# Patient Record
Sex: MALE | Race: WHITE
[De-identification: names, ages, dates, MRNs, and addresses within clinical notes are randomized per-mention and may not be internally consistent; named-entity substitution may affect disease eponyms.]

---

## 2017-10-07 ENCOUNTER — HOSPITAL ENCOUNTER (EMERGENCY)
Dept: HOSPITAL 25 - UCEAST | Age: 37
Discharge: HOME | End: 2017-10-07
Payer: COMMERCIAL

## 2017-10-07 VITALS — DIASTOLIC BLOOD PRESSURE: 89 MMHG | SYSTOLIC BLOOD PRESSURE: 129 MMHG

## 2017-10-07 DIAGNOSIS — S50.819A: ICD-10-CM
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
## 2017-10-07 DIAGNOSIS — S51.859A: Primary | ICD-10-CM

## 2017-10-07 DIAGNOSIS — S61.459A: ICD-10-CM

## 2017-10-07 DIAGNOSIS — S60.519A: ICD-10-CM

## 2017-10-07 PROCEDURE — 90715 TDAP VACCINE 7 YRS/> IM: CPT

## 2017-10-07 PROCEDURE — G0463 HOSPITAL OUTPT CLINIC VISIT: HCPCS

## 2017-10-07 PROCEDURE — 90471 IMMUNIZATION ADMIN: CPT

## 2017-10-07 PROCEDURE — 99212 OFFICE O/P EST SF 10 MIN: CPT

## 2017-10-07 NOTE — UC
Bite Injury/Animal HPI





- HPI Summary


HPI Summary: 





Patient presents following a cat bite and scratches that occurred while he was 

vaccinating the cat at the [facility]. He reports several scratches and 

puncture wounds on his hands and forearms. He states the cat had no prior 

immunizations. His tetanus need to be updated today. Bleeding is controlled. 





- History of Current Complaint


Stated Complaint: CAT BITE


Time Seen by Provider: 10/07/17 13:08


Hx Obtained From: Patient


Severity Currently: Moderate


Severity Initially: Moderate


Onset/Duration: Sudden Onset, Lasting Hours


Type of Bite: Animal


Has Animal Been Immunized?: Yes


Character: Puncture, Abrasion/Laceration


Associated Signs And Symptoms: Positive: Negative


Animal Available for Observation: Yes


Animal Control Notified: Yes





- Allergies/Home Medications


Allergies/Adverse Reactions: 


 Allergies











Allergy/AdvReac Type Severity Reaction Status Date / Time


 


Penicillins [PCN] Allergy  See Comment Verified 10/07/17 13:28














PMH/Surg Hx/FS Hx/Imm Hx


Previously Healthy: Yes





- Surgical History


Surgical History: None





- Family History


Known Family History: Positive: None





- Social History


Occupation: Employed Full-time


Alcohol Use: None


Substance Use Type: None


Smoking Status (MU): Never Smoked Tobacco





- Immunization History


Most Recent Influenza Vaccination: 2014


Most Recent Tetanus Shot: unk


Most Recent Pneumonia Vaccination: none





Review of Systems


Constitutional: Negative


Skin: Other - dorum of hands and forearm with several puncture wounds and 

scratches.


Eyes: Negative


ENT: Negative


Respiratory: Negative


Cardiovascular: Negative


Gastrointestinal: Negative


Genitourinary: Negative


Motor: Negative


Neurovascular: Negative


Musculoskeletal: Negative


Neurological: Negative


Psychological: Negative


All Other Systems Reviewed And Are Negative: Yes





Physical Exam


Triage Information Reviewed: Yes


Eye Exam: Normal


ENT Exam: Normal


Dental Exam: Normal


Neck exam: Normal


Neck: Positive: 1


Respiratory Exam: Normal


Cardiovascular Exam: Normal


Abdominal Exam: Normal


Musculoskeletal Exam: Normal


Neurological Exam: Normal


Psychological Exam: Normal


Skin Exam: Normal





Bite Injury Course/Dx





- Course


Course Of Treatment: Patient given tetanus, and rabies form filled out. wounds 

were cleaned. health department notified.





- Differential Dx/Diagnosis


Differential Diagnosis/HQI/PQRI: Puncture - cat bite abrasion puncture wound


Provider Diagnoses: cat bite.  abrasions.  puncture wounds





Discharge





- Discharge Plan


Condition: Stable


Disposition: HOME


Prescriptions: 


DOXYcycline CAP(*) [DOXYcycline 100MG CAP(*)] 100 mg PO BID #20 cap


Patient Education Materials:  Animal Bite (ED), Diphtheria/Acellular Pertussis/

Tetanus Vaccine (By injection)


Referrals: 


No Primary Care Phys,NOPCP [Primary Care Provider] - 


Additional Instructions: 


If you develop any worsening symptoms of infection you will need to go to the 

ER.

## 2019-07-22 ENCOUNTER — HOSPITAL ENCOUNTER (EMERGENCY)
Dept: HOSPITAL 25 - ED | Age: 39
Discharge: HOME | End: 2019-07-22
Payer: COMMERCIAL

## 2019-07-22 VITALS — SYSTOLIC BLOOD PRESSURE: 127 MMHG | DIASTOLIC BLOOD PRESSURE: 84 MMHG

## 2019-07-22 DIAGNOSIS — R07.9: Primary | ICD-10-CM

## 2019-07-22 DIAGNOSIS — L23.7: ICD-10-CM

## 2019-07-22 DIAGNOSIS — I20.9: ICD-10-CM

## 2019-07-22 DIAGNOSIS — Z88.0: ICD-10-CM

## 2019-07-22 DIAGNOSIS — Z79.899: ICD-10-CM
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
## 2019-07-22 LAB
ALBUMIN SERPL BCG-MCNC: 4.2 G/DL (ref 3.2–5.2)
ALBUMIN/GLOB SERPL: 1.9 {RATIO} (ref 1–3)
ALP SERPL-CCNC: 50 U/L (ref 34–104)
ALT SERPL W P-5'-P-CCNC: 25 U/L (ref 7–52)
ANION GAP SERPL CALC-SCNC: 7 MMOL/L (ref 2–11)
AST SERPL-CCNC: 16 U/L (ref 13–39)
BASOPHILS # BLD AUTO: 0.2 10^3/UL (ref 0–0.2)
BUN SERPL-MCNC: 21 MG/DL (ref 6–24)
BUN/CREAT SERPL: 25.6 (ref 8–20)
CALCIUM SERPL-MCNC: 9.3 MG/DL (ref 8.6–10.3)
CHLORIDE SERPL-SCNC: 104 MMOL/L (ref 101–111)
EOSINOPHIL # BLD AUTO: 0.7 10^3/UL (ref 0–0.6)
GLOBULIN SER CALC-MCNC: 2.2 G/DL (ref 2–4)
GLUCOSE SERPL-MCNC: 107 MG/DL (ref 70–100)
HCO3 SERPL-SCNC: 27 MMOL/L (ref 22–32)
HCT VFR BLD AUTO: 45 % (ref 42–52)
HGB BLD-MCNC: 15.6 G/DL (ref 14–18)
INR PPP/BLD: 0.93 (ref 0.82–1.09)
LYMPHOCYTES # BLD AUTO: 5.4 10^3/UL (ref 1–4.8)
MCH RBC QN AUTO: 31 PG (ref 27–31)
MCHC RBC AUTO-ENTMCNC: 35 G/DL (ref 31–36)
MCV RBC AUTO: 90 FL (ref 80–94)
MONOCYTES # BLD AUTO: 0.8 10^3/UL (ref 0–0.8)
NEUTROPHILS # BLD AUTO: 6.5 10^3/UL (ref 1.5–7.7)
NRBC # BLD AUTO: 0 10^3/UL
NRBC BLD QL AUTO: 0.1
PLATELET # BLD AUTO: 257 10^3/UL (ref 150–450)
POTASSIUM SERPL-SCNC: 3.7 MMOL/L (ref 3.5–5)
PROT SERPL-MCNC: 6.4 G/DL (ref 6.4–8.9)
RBC # BLD AUTO: 4.97 10^6 /UL (ref 4.18–5.48)
SODIUM SERPL-SCNC: 138 MMOL/L (ref 135–145)
TROPONIN I SERPL-MCNC: 0 NG/ML (ref ?–0.04)
WBC # BLD AUTO: 13.5 10^3/UL (ref 3.5–10.8)

## 2019-07-22 PROCEDURE — 85060 BLOOD SMEAR INTERPRETATION: CPT

## 2019-07-22 PROCEDURE — 84484 ASSAY OF TROPONIN QUANT: CPT

## 2019-07-22 PROCEDURE — 83880 ASSAY OF NATRIURETIC PEPTIDE: CPT

## 2019-07-22 PROCEDURE — 80053 COMPREHEN METABOLIC PANEL: CPT

## 2019-07-22 PROCEDURE — 83605 ASSAY OF LACTIC ACID: CPT

## 2019-07-22 PROCEDURE — 85025 COMPLETE CBC W/AUTO DIFF WBC: CPT

## 2019-07-22 PROCEDURE — 85610 PROTHROMBIN TIME: CPT

## 2019-07-22 PROCEDURE — 71045 X-RAY EXAM CHEST 1 VIEW: CPT

## 2019-07-22 PROCEDURE — 93005 ELECTROCARDIOGRAM TRACING: CPT

## 2019-07-22 PROCEDURE — 99284 EMERGENCY DEPT VISIT MOD MDM: CPT

## 2019-07-22 PROCEDURE — 36415 COLL VENOUS BLD VENIPUNCTURE: CPT

## 2019-07-22 NOTE — ED
HPI Chest Pain





- HPI Summary


HPI Summary: 





This pt is a 39 y/o male presenting to [facility] c/o chest pain since 01:00 today. 

Pt describes his chest pain as substernal that is not radiating. He notes his 

chest pain currently has calmed down. Initially his chest pain was described as 

sharp and stabbing but currently notes "there's just an ache there." Denies SOB

, nausea, diaphoresis, or any other symptoms with chest pain. He rates his 

chest pain now 5/10 in severity. 


Pt reports hx of myopericarditis approximately 5 years ago. He denies any 

recent runny nose, URI, cough. 


He does have poison ivy rash for which he was prescribed 60 mg of Prednisone 

and a topical steroid. Pt has taken Prednisone for the past 6 days.





- History of Current Complaint


Chief Complaint: EDChestPainROMI


Hx Obtained From: Patient


Onset/Duration: Started Hours Ago, Still Present


Timing: Lasting Hours


Current Severity: Moderate


Pain Intensity: 5


Pain Scale Used: 0-10 Numeric


Chest Pain Location: Mid Sternal


Chest Pain Radiates: No


Character: Dull/Aching - Ache now


Aggravating Factor(s): Nothing


Alleviating Factor(s): Nothing


Associated Signs and Symptoms: Positive: Chest Pain.  Negative: Shortness of 

Breath, Diaphoresis, Nausea, Cough, URI





- Allergy/Home Medications


Allergies/Adverse Reactions: 


 Allergies











Allergy/AdvReac Type Severity Reaction Status Date / Time


 


Penicillins Allergy  See Comment Verified 07/22/19 08:54











Home Medications: 


 Home Medications





predniSONE TAB* [Deltasone 20 MG TAB*] 60 mg PO DAILY 07/22/19 [History 

Confirmed 07/22/19]











PMH/Surg Hx/FS Hx/Imm Hx


Endocrine/Hematology History: 


   Denies: Hx Diabetes, Hx Thyroid Disease


Cardiovascular History: Reports: Hx Angina


   Denies: Hx Hypertension - myopericarditis 6 months ago, Hx Pacemaker/ICD


Respiratory History: 


   Denies: Hx Asthma, Hx Chronic Obstructive Pulmonary Disease (COPD)


GI History: 


   Denies: Hx Ulcer


 History: 


   Denies: Hx Renal Disease


Sensory History: 


   Denies: Hx Hearing Aid


Psychiatric History: 


   Denies: Hx Panic Disorder





- Surgical History


Surgery Procedure, Year, and Place: appy


Infectious Disease History: No


Infectious Disease History: 


   Denies: Hx Clostridium Difficile, Hx Hepatitis, Hx Human Immunodeficiency 

Virus (HIV), Hx of Known/Suspected MRSA, Hx Shingles, Hx Tuberculosis, History 

Other Infectious Disease, Traveled Outside the [place] in Last 30 Days





- Family History


Known Family History: 


   Negative: Cardiac Disease, Diabetes





- Social History


Alcohol Use: None


Substance Use Type: Reports: None


Smoking Status (MU): Never Smoked Tobacco





Review of Systems


Negative: Fever, Skin Diaphoresis


Negative: Nasal Discharge


Positive: Chest Pain


Negative: Shortness Of Breath, Cough


Negative: Nausea


All Other Systems Reviewed And Are Negative: Yes





Physical Exam





- Summary


Physical Exam Summary: 





Constitutional: Well-developed, Well-nourished, Alert. (-) Distressed


Skin: Warm, Dry


HENT: Normocephalic; Atraumatic


Eyes: Conjunctiva normal


Neck: Musculoskeletal ROM normal neck. (-) JVD, (-) Stridor, (-) Tracheal 

deviation


Cardio: Rhythm regular, rate normal, Heart sounds normal; Intact distal pulses; 

The pedal pulses are 2+ and symmetric. Radial pulses are 2+ and symmetric. (-) 

Murmur


Pulmonary/Chest wall: Effort normal. (-) Respiratory distress, (-) Wheezes, (-) 

Rales


Abd: Soft, (-) tenderness, (-) Distension, (-) Guarding, (-) Rebound


Musculoskeletal: (-) Edema


Lymph: (-) Cervical adenopathy


Neuro: Alert, Oriented x3


Psych: Mood and affect Normal


Triage Information Reviewed: Yes


Vital Signs On Initial Exam: 


 Initial Vitals











Temp Pulse Resp BP Pulse Ox


 


 97.4 F   76   18   150/91   100 


 


 07/22/19 08:19  07/22/19 08:19  07/22/19 08:19  07/22/19 08:19  07/22/19 08:19











Vital Signs Reviewed: Yes





Diagnostics





- Vital Signs


 Vital Signs











  Temp Pulse Resp BP Pulse Ox


 


 07/22/19 08:19  97.4 F  76  18  150/91  100














- Laboratory


Result Diagrams: 


 07/22/19 08:37





 07/22/19 08:37


Lab Statement: Any lab studies that have been ordered have been reviewed, and 

results considered in the medical decision making process.





- Radiology


  ** Chest XR


Radiology Interpretation Completed By: Radiologist


Summary of Radiographic Findings: IMPRESSION: No evidence for active 

cardiopulmonary disease. Dr. Davis has reviewed this report.





- EKG


  ** 08:


Cardiac Rate: NL - at 67 bpm


EKG Rhythm: Sinus Rhythm


Summary of EKG Findings: Normal NM, Borderline QRS, normal QTc. STs are 

elevated in leads V2, V3, V4, and V6. Normal early repolarization. Normal EKG.





  ** 08:18


Cardiac Rate: NL - at 67 bpm


EKG Rhythm: Sinus Rhythm


Summary of EKG Findings: Normal NM, Borderline QRS, normal QTc. STs are 

elevated in leads V2, V3, V4, and V6. Normal early repolarization. Normal EKG.





  ** 12:33


Cardiac Rate: NL - at 65 bpm


EKG Rhythm: Sinus Rhythm


Summary of EKG Findings: Normal NM, Normal QRS, Normal QTc.  ST elevations in V2

, V3, V4, V5, V6.  Normal early repolarization. Normal EKG.





Re-Evaluation





- Re-Evaluation


  ** First Eval


Re-Evaluation Time: 09:14


Change: Improved


Comment: His pain is getting better but nitroglycerin with no effect.





  ** Second Eval


Re-Evaluation Time: 10:28


Change: Improved


Comment: His pain is down to 1-2 but pain is still there. GI cocktail with no 

effect. Will wait for second troponin and second EKG





  ** Third Eval


Re-Evaluation Time: 12:41


Change: Improved


Comment: Reviewed second EKG and negative troponins with patient. Will speak 

with cardiology.





  ** Fourth Eval


Re-Evaluation Time: 13:05


Comment: Reviewed discharge plan with patient.





Chest Pain Course/Dx





- Course


Assessment/Plan: Pt is a 39 y/o male, with hx of myopericarditis, presenting to 

[facility] c/o chest pain since 01:00 today. Pt describes his chest pain as 

substernal that is not radiating. He notes his chest pain currently has calmed 

down.  Physical exam is unremarkable.  Lab results are unremarkable except for 

WBC of 13.5. Patient has two negative troponins.  First EKG is a normal sinus 

rhyhtm at 67 bpm, normal EKG. Second EKG shows normal sinus rhythm at 65 bpm, 

normal EKG.  In the ED course the pt was given nitroglycerin and GI cocktail.  

Discussed the case with Dr. Alejandra, cardiologist, who recommends steroids and 

discharge the pt home.  Pt was given a prescription for Prednisone.





- Diagnoses


Provider Diagnoses: 


 Chest pain, Poison ivy








- Provider Notifications


Discussed Care Of Patient With: Black Alejandra


Time Discussed With Above Provider: 13:03


Instructed by Provider To: Other - Discussed with Dr. Alejandra, cardiologist, who 

recommends steroids and discharge.





Discharge





- Sign-Out/Discharge


Documenting (check all that apply): Patient Departure - Discharge home


Patient Received Moderate/Deep Sedation with Procedure: No





- Discharge Plan


Condition: Stable


Disposition: HOME


Prescriptions: 


predniSONE [Prednisone 20 MG TAB] 40 mg PO DAILY 7 Days #14 tablet


Patient Education Materials:  Chest Pain (ED), Poison Ivy (ED)


Print Language: ENGLISH


Referrals: 


Aleksandr Pearl MD [Primary Care Provider] - 





- Billing Disposition and Condition


Condition: STABLE


Disposition: Home





- Attestation Statements


Document Initiated by Scribe: Yes


Documenting Scribe: Renata Moise


Provider For Whom Santiagoibmadeleine is Documenting (Include Credential): Honey Marquis MD


Scribe Attestation: 


Renata MAGANA scrgordyed for Honey Wu MD on 07/22/19 at 1917. 


Scribe Documentation Reviewed: Yes


Provider Attestation: 


The documentation as recorded by the Renata victor accurately reflects 

the service I personally performed and the decisions made by me, Honey Wu MD


Status of Scribe Document: Viewed

## 2019-10-21 ENCOUNTER — HOSPITAL ENCOUNTER (EMERGENCY)
Dept: HOSPITAL 25 - ED | Age: 39
Discharge: HOME | End: 2019-10-21
Payer: COMMERCIAL

## 2019-10-21 VITALS — DIASTOLIC BLOOD PRESSURE: 64 MMHG | SYSTOLIC BLOOD PRESSURE: 131 MMHG

## 2019-10-21 DIAGNOSIS — T65.891A: Primary | ICD-10-CM

## 2019-10-21 DIAGNOSIS — H10.211: ICD-10-CM
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
## 2019-10-21 DIAGNOSIS — Z88.0: ICD-10-CM

## 2019-10-21 PROCEDURE — 99282 EMERGENCY DEPT VISIT SF MDM: CPT

## 2019-10-21 NOTE — XMS REPORT
Continuity of Care Document (CCD)

 Created on:2019



Patient:Lasha Quiroz

Sex:Male

:1980

External Reference #:[id number]





Demographics







 Address  [address] [place] [id number]

 

 Home Phone  [phone]

 

 Mobile Phone  [phone]

 

 Work Phone  [phone]

 

 Email Address  [email or web address]

 

 Preferred Language  en

 

 Marital Status  Not  or 

 

 Yazdanism Affiliation  Unknown

 

 Race  White

 

 Ethnic Group  Not  or 









Author







 Name  Aleksandr Beebe MD

 

 Address  [address] [place] [address]









Care Team Providers







 Name  Role  Phone

 

 Aleksandr Pearl MD - Family  Care Team Information   [phone]
[phone]



 Medicine    

 

 [facility] Utilization   Care Team Information   [phone]
[phone]



 - Health Educator    

 

 Lakhwinder Kevin MD - Cardiovascular  Care Team Information   [phone]
[phone]



 Disease    









Problems







 Active Problems  Provider  Date

 

 Arthralgia of the lower leg  Clayton Alcantar M.D.  Onset: 2013

 

 Enthesopathy  Clayton Alcantar M.D.  Onset: 2012

 

 Contact dermatitis due to plants, except  Aleksandr Pearl M.D.  Onset: 



 food    







Social History







 Type  Date  Description  Comments

 

 Birth Sex    Unknown  

 

 Tobacco Use  Start: Unknown  Nonsmoker  

 

 Tobacco Use  Start: Unknown  Patient has never smoked  

 

 Smoking Status  Reviewed: 18  Patient has never smoked  







Allergies, Adverse Reactions, Alerts







 Active Allergies  Reaction  Severity  Comments  Date

 

 Penicillins        2014









 Inactive Allergies









 NKDA        2012







Medications







 Active Medications  SIG  Qnty  Indications  Ordering Provider  Date

 

 Clobetasol Propionate  use twice a day  60gm    Aleksandr MARSH  2019



                   0.05%  on arms and      MD Abiel  



 Cream  trunk        

 

 Multivitamins  1 po qd      Unknown  



            Tablets          



           

 

 Ibuprofen  3 tabs by mouth      Unknown  



       200mg Tablets  up to 4 times a        



   day as needed        









 History Medications









 Prednisone  3 tabs daily  18tabs  R21  Aleksandr MARSH  2019 -



           10mg  for 3 days,      MD Abiel  2019



 Tablets  then 2 tabs        



   daily for 3        



   days, then 1        



   tab daily for 3        



   days.        

 

 Clobetasol  use on arms and  30gm    Aleksandr T.  2019 -



 Propionate  trunk as needed      MD Abiel  2019



           0.05%  for up to 14        



 Cream  days        



           







Immunizations







 CPT Code  Status  Date  Vaccine  Lot #

 

 [id number]  Given  2018  VFC Influenza vac quadrivalent preservative  



       free 3yrs and up  

 

 04715  Given  2016  Influenza vac quadrivalent preservative free  



       3yrs and up  

 

 24808  Given  2010  DO Not Use Split Influenza Virus Vaccine  [id number]

 

 62849  Given  2004  Td Immunization, For Use In Individuals 7 Years  



       Or Older  







Vital Signs







 Date  Vital  Result  Comment

 

 2019  4:42pm  BP Systolic  118 mmHg  









 BP Diastolic  72 mmHg  

 

 Heart Rate  82 /min  

 

 Body Temperature  97.9 F  

 

 Respiratory Rate  16 /min  

 

 Weight  228.00 lb  









 2019  4:49pm  BP Systolic  120 mmHg  









 BP Diastolic  70 mmHg  

 

 Heart Rate  90 /min  

 

 Body Temperature  98.2 F  

 

 Respiratory Rate  20 /min  

 

 Weight  233.00 lb  







Results







 Test  Date  Facility  Test  Result  H/L  Range  Note

 

 Laboratory test  2019  [facility]  Troponin I  0.00 ng/mL    <0.04  1



 finding              

 

 Laboratory test  2019  [facility]  B-Type Natriuretic  25 pg/mL    <=100  



 finding      Peptide BNP        









 Lactic Acid  1.4 mmol/L  Normal  0.5-2.0  2









 Inr/Protime  2019  [facility]  Inr  0.93  Normal  0.82-1.09  3

 

 CBC Auto Diff  2019  [facility]  White Blood Count  13.5 10^3/uL  High  3.5-
10.8  









 Red Blood Count  4.97 10^6/uL  Normal  4.18-5.48  

 

 Hemoglobin  15.6 g/dL  Normal  14.0-18.0  

 

 Hematocrit  45 %  Normal  42-52  

 

 Mean Corpuscular Volume  90 fL  Normal  80-94  

 

 Mean Corpuscular Hemoglobin  31 pg  Normal  27-31  

 

 Mean Corpuscular HGB Conc  35 g/dL  Normal  31-36  

 

 Red Cell Distribution Width  14 %  Normal  10-15  

 

 Platelet Count  257 10^3/uL  Normal  150-450  

 

 Mean Platelet Volume  8.4 fL  Normal  7.4-10.4  

 

 Abs Neutrophils  6.5 10^3/uL  Normal  1.5-7.7  

 

 Abs Lymphocytes  5.4 10^3/uL  High  1.0-4.8  

 

 Abs Monocytes  0.8 10^3/uL  Normal  0-0.8  

 

 Abs Eosinophils  0.7 10^3/uL  High  0-0.6  

 

 Abs Basophils  0.2 10^3/uL  Normal  0-0.2  

 

 Abs Nucleated RBC  0.0 10^3/uL      

 

 Granulocyte %  48.5 %      

 

 Lymphocyte %  39.7 %      

 

 Monocyte %  5.6 %      

 

 Eosinophil %  4.9 %      

 

 Basophil %  1.3 %      

 

 Nucleated Red Blood Cells %  0.1      









 Comp Metabolic Panel  2019  [facility]  Sodium  138 mmol/L  Normal  135-145  









 Chloride  104 mmol/L  Normal  101-111  

 

 Co2 Carbon Dioxide  27 mmol/L  Normal  22-32  

 

 Glucose  107 mg/dL  High    

 

 Blood Urea Nitrogen  21 mg/dL  Normal  6-24  

 

 Creatinine  0.82 mg/dL  Normal  0.67-1.17  

 

 BUN/Creatinine Ratio  25.6  High  8-20  

 

 Calcium  9.3 mg/dL  Normal  8.6-10.3  

 

 Total Protein  6.4 g/dL  Normal  6.4-8.9  

 

 Albumin  4.2 g/dL  Normal  3.2-5.2  

 

 Globulin  2.2 g/dL  Normal  2-4  

 

 Albumin/Globulin Ratio  1.9  Normal  1-3  

 

 Total Bilirubin  0.40 mg/dL  Normal  0.2-1.0  

 

 Alkaline Phosphatase  50 U/L  Normal    

 

 Alt  25 U/L  Normal  7-52  

 

 Egfr Non-  105.1    >60  

 

 Egfr   127.2    >60  4

 

 Potassium  3.7 mmol/L  Normal  3.5-5.0  

 

 Anion Gap  7 mmol/L  Normal  2-11  

 

 Ast  16 U/L  Normal  13-39  









 Laboratory test finding  2019  [facility]  Troponin I  0.00 ng/mL    <0.04  5









 Pathologist Review  (SEE NOTE)      6









 1  Troponin-I testing on Plasma Separator Tubes (PST) has a



   known false positive rate of 0.20-0.40%.  All positive



   troponins reflex immediately to secondary confirmatory



   testing.



   



   Using the [employer] 800 Access Immunoassay systems, the



   99th percentile upper reference limit was demonstrated to be



   < 0.03 ng/mL.

 

 2  [facility] Severe Sepsis and Septic Shock Management Bundle Measure



   requires all lactic acids initially measuring >2.0 mmol/L be



   repeated.

 

 3  Standard intensity warfarin therapeutic range: 2.0-3.0



   High intensity warfarin therapeutic range: 2.5-3.5

 

 4  *******Because ethnic data is not always readily available,



   this report includes an eGFR for both -Americans and



   non- Americans.****



   The National Kidney Disease Education Program (NKDEP) does



   not endorse the use of the MDRD equation for patients that



   are not between the ages of 18 and 70, are pregnant, have



   extremes of body size, muscle mass, or nutritional status,



   or are non- or non-.



   According to the National Kidney Foundation, irrespective of



   diagnosis, the stage of the disease is based on the level of



   kidney function:



   Stage Description                      GFR(mL/min/1.73 m(2))



   1     Kidney damage with normal or decreased GFR       90



   2     Kidney damage with mild decrease in GFR          60-89



   3     Moderate decrease in GFR                         30-59



   4     Severe decrease in GFR                           15-29



   5     Kidney failure                       <15 (or dialysis)

 

 5  Troponin-I testing on Plasma Separator Tubes (PST) has a



   known false positive rate of 0.20-0.40%.  All positive



   troponins reflex immediately to secondary confirmatory



   testing.



   



   Using the [employer] 800 Access Immunoassay systems, the



   99th percentile upper reference limit was demonstrated to be



   < 0.03 ng/mL.

 

 6  Reactive lymphocytosis and monocytosis.



   



   Reviewed by Christen Scott MD







Procedures







 Description

 

 No Information Available







Medical Devices







 Description

 

 No Information Available







Encounters







 Type  Date  Location  Provider  Dx  Diagnosis

 

 Office Visit  2019  Main Office  Aleksandr MARSH  R07.89  Other chest pain



   4:30p    MD Abiel    









 L24.7  Irritant contact dermatitis due to plants, except food







Assessments







 Date  Code  Description  Provider

 

 2019  R21  Rash and other nonspecific skin eruption  Aleksandr Beebe MD

 

 2019  R07.89  Other chest pain  Aleksandr Beebe MD

 

 2019  L24.7  Irritant contact dermatitis due to plants,  Aleksandr Beebe MD



     except food  







Plan of Treatment

2019 - Aleksandr Beebe MDR21 Rash and other nonspecific skin 
eruptionAllNew Medication:Clobetasol Propionate 0.05 % - use twice a day on 
arms and trunkComments:Medication Management Patient Understands medications he'
s taking?     Yes    No  Are there Barriersto Adherence?    Yes    No  Has the 
patient been asked about herbal supplements and therapies, and OTC  meds?      
Yes    No



Functional Status







 Description

 

 No Information Available







Mental Status







 Description

 

 No Information Available







Referrals







 Refer to   Reason for Referral  Status  Appt Date

 

 Lakhwinder Kevin MD   Chest pain with a history of myopericarditis 5  Scheduled  
2019



   yrs ago    









 [address]

 

 [place] [address] [phone]

## 2019-10-21 NOTE — XMS REPORT
[facility] - Continuity of Care Document

 Created on:2019



Patient:SAM YEAGER

Sex:Male

:1980

External Reference #:[id number]





Demographics







 Address  [address] [place], [place] [address]

 

 Mobile Phone  [phone]

 

 Preferred Language  en

 

 Marital Status  Not  or 

 

 Evangelical Affiliation  Unknown

 

 Race  White

 

 Ethnic Group  Not  or 









Author







 Organization  [facility]









Support







 Name  Relationship  Address  Phone

 

 DWAINE YEAGER  spouse  [address] [place] [address]  [phone]



     [place] [address]  

 

 DWAINE YEAGER  spouse  [address]  [phone]



     [place] [address]  









Allergies and Intolerances

No Known Allergies



Medications

No Known Medications



Medications At Time Of Discharge

No data in the system



Problems

No Data in the system



Procedures

No data in the system



Results

******** Laboratory Results ******** Order:  CBC DIFF Specimen Source: Body Site
: Legend: (G,H) = High, (GG,HH,CH,#H) = Above High Threshold, (#,L) = Low, (##,
CL,#L,LL) = Below Low Threshold, (C,CC,CA,#A,A) = Abnormal





  LOINC   Test   Result   Flag   Range   Units   Date

 

 90-2  1WBC # Bld Auto  6.3    4.8-10.8  K/uL  10/05/2019 14:29

 

 26309-8  1RBC # Bld  5.10    4.60-6.20  M/uL  10/05/2019 14:29

 

 718-7  1Hgb Bld-mCnc  15.6    13.5-18.0  gm/dL  10/05/2019 14:29

 

 4544-3  1Hct VFr Bld Auto  46.4    41.0-53.0  %  10/05/2019 14:29

 

 787-2  1MCV RBC Auto  91.0    80.0-100.0  fL  10/05/2019 14:29

 

 40397-2  1MCHC RBC-mCnc  33.6    30.0-36.5  %  10/05/2019 14:29

 

 28405-4  1MCH RBC Qn  30.6    27.0-34.0  pg  10/05/2019 14:29

 

 99596-8  1RDW RBC  11.8    11.0-15.0  %  10/05/2019 14:29

 

 777-3  1Platelet # Bld Auto  239    130-450  K/uL  10/05/2019 14:29

 

 73042-3  1PMV Bld Auto  7.7    6.0-12.0  fL  10/05/2019 14:29

 

 751-8  1Neutrophils # Bld Auto  46    37-80  %  10/05/2019 14:29

 

 86651-8  1Lymphocytes NFr Bld  43    10-50  %  10/05/2019 14:29

 

 5905-5  1Monocytes NFr Bld Auto  9    0-12  %  10/05/2019 14:29

 

 18898-9  1Eosinophil # Bld  1    <=8  %  10/05/2019 14:29

 

 704-7  1Basophils # Bld Auto  1    <=3  %  10/05/2019 14:29

 

 13041-8  1Neutrophils # Bld  2.9    1.8-8.6  K/uL  10/05/2019 14:29

 

 731-0  1Lymphocytes # Bld Auto  2.7    0.5-5.0  K/uL  10/05/2019 14:29

 

 742-7  1Monocytes # Bld Auto  0.6    0.0-1.3  K/uL  10/05/2019 14:29

 

 70957-6  1Eosinophil # Bld  0.1    0.0-0.9  K/uL  10/05/2019 14:29

 

 704-7  1Basophils # Bld Auto  0.1    0.0-0.3  K/ul  10/05/2019 14:29



 Performing Lab Footnotes:[facility] - [id number] - 
[address] [place] [address] TRINITY ARNOLD SALUDD1





 _______________________________________________________________________________
_______ Order:  COMPREHENSIVE PANEL Specimen Source: Body Site: Legend: (G,H) = 
High, (GG,HH,CH,#H) = Above High Threshold, (#,L) = Low, (##,CL,#L,LL) = Below 
Low Threshold, (C,CC,CA,#A,A) = Abnormal





  LOINC   Test   Result   Flag   Range   Units   Date

 

 2951-2  1Sodium SerPl-sCnc  141    136-145  mmol/L  10/05/2019 14:29

 

 2823-3  1Potassium SerPl-sCnc  4.0    3.5-5.2  mmol/L  10/05/2019 14:29

 

 5-0  1Chloride SerPl-sCnc  107    100-108  mmol/L  10/05/2019 14:29

 

 8-9  1CO2 SerPl-sCnc  26    21-32  mmol/L  10/05/2019 14:29

 

 2345-7  1Glucose SerPl-mCnc  89      mg/dL  10/05/2019 14:29

 

 3094-0  1BUN SerPl-mCnc  15    7-21  mg/dL  10/05/2019 14:29

 

 2160-0  1Creat SerPl-mCnc  0.9    0.6-1.3  mg/dL  10/05/2019 14:29









 **Interpretive :  1Normal Kidney Function or Mild Disease - GFR >OR= 60 
Chronic



   Kidney Disease - GFR 15-59 Renal Failure - GFR < 15 GFR not



   calculated on patients under 18 years of age.  Calculated



   (estimated) GFR is based on the MDRD Study equation, which



   assumes a steady state for creatinine.  Estimated GFR may not be



   appropriate for medication dosing.









 26324-1  1Ca-I SerPl-mCnc  9.7    8.5-10.8  mg/dL  10/05/2019 14:29

 

 58788-0  1GFR/BSA.pred SerPl-ArVRat  >60        10/05/2019 14:29

 

 52156-4  1Bilirub Bld-mCnc  0.4    0.0-1.2  mg/dL  10/05/2019 14:29

 

 2885-2  1Prot SerPl-mCnc  7.0    6.4-8.2  gm/dL  10/05/2019 14:29

 

 1751-7  1Albumin SerPl-mCnc  4.4    3.4-4.8  gm/dL  10/05/2019 14:29

 

 6768-6  1ALP SerPl-cCnc  61      U/L  10/05/2019 14:29

 

 1742-6  1ALT SerPl-cCnc  23    0-55  U/L  10/05/2019 14:29

 

 1920-8  1AST SerPl-cCnc  18    5-37  U/L  10/05/2019 14:29



 Performing Lab Footnotes:[facility] - [id number] - 
[address] [place] [address] TRINITY MENDEZ





 _______________________________________________________________________________
_______******** Reference Laboratory Results ******** Order:  LYME AB with 
Confirmation (3 Days to Results) Specimen Source: Body Site:





  LOINC Code   Test   Result   Flag   Range   Units   Date

 

 36187-0  1Borrelia burgdorferi  <0.91    0.00-0.90  ISR  10/5/2019



   Ab.IgG+Ig          2:29:00 PM









   Note:

 

   Negative         <0.91

 

   Equivocal  0.91 - 1.09

 

   Positive         >1.09



Performing Lab Footnotes:[facility] [phone] - [address] [place], [place] [id number] LUISA B REYES1





 _______________________________________________________________________________
_______



Social History







 Code  Code System  Social History Observation  Description  Dates Observed

 

 358544240  SNOMED CT  Current Smoking Status  Never smoker  

 

 UNK  AdministrativeGender  Sex Assigned At Birth  Unknown  







Vital Signs







 Code  Code System  Vitals  Value  Date

 

 2710-5  LOINC  Body Temperature  97.6 [degF]  10/05/2019

 

 8865-8  LOINC  Pulse Rate  70 {beats}/min  10/05/2019

 

 9279-1  [facility]  Respiratory Rate  18 /min  10/05/2019

 

 18363-9  INC  O2% BldC Oximetry  99 %  10/05/2019

 

 8480-6  INC  BP Systolic  142 mm[Hg]  10/05/2019

 

 8462-4  LOINC  BP Diastolic  87 mm[Hg]  10/05/2019

 

 8302-2  INC  Height  71 [in_i]  10/05/2019

 

 68220-6  INC  Weight  104.54 kg  10/05/2019

 

 3140-1  LOINC  Body surface area Derived from formula  2.24 m2  10/05/2019

 

 74383-1  INC  BMI (Body Mass Index)  32.2 kg/m2  10/05/2019







Goals Section

No data in the system



Health Concerns

No data in the systemEncounter Diagnosis









 Date  Code  Code System  Diagnosis  Status

 

   Z23  ICD10  ENCOUNTER FOR IMMUNIZATION  Active







Advance Directives

No Advance Directives Reported



Encounters







 Encounter Diagnosis  Location  Date

 

  ENCOUNTER FOR IMMUNIZATION  [facility]  10/05/2019







Family History

Family history not obtained



Functional Status







 Code  Functional Condition  Code System  Date  Status

 

   Independent adls  SNOMED CT  10/05/2019  Active

 

   Appears well nourished/hydrated  SNOMED CT  10/05/2019  Active







Immunizations







 Vaccine Code  Code System  Vaccine Name  Date  Status

 

 175  CVX  Human Rabies vaccine from human diploid  10/02/2019  Completed



     cell culture    

 

 175  CVX  Human Rabies vaccine from human diploid  10/05/2019  Completed



     cell culture    

 

 115  CVX  tetanus toxoid, reduced diphtheria  10/05/2019  Completed



     toxoid, and acellular pertussis    



     vaccine, adsorbed    







Medical Equipment

No data in the system



Mental Status







 Code  Cognitive Condition  Code System  Date  Status

 

   Perrl  SNOMED CT  10/05/2019  Active

 

   Oriented x 3  SNOMED CT  10/05/2019  Active







Assessment and Plan

Assessments

No data in the systemPlan Of Treatment

No data in the systemPending Tests

No data in the system



Hospital Discharge Instructions

No data in the system



Reason for Visit







 Reason for Visit

 

 Rabies Vaccine
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
## 2019-10-21 NOTE — XMS REPORT
Continuity of Care Document (CCD)

 Created on:2019



Patient:Lasha Quiroz

Sex:Male

:1980

External Reference #:[id number]





Demographics







 Address  [address] [place] [address]

 

 Home Phone  [phone]

 

 Preferred Language  en

 

 Marital Status  Not  or 

 

 Mandaen Affiliation  Unknown

 

 Race  White

 

 Ethnic Group  Not  or 









Author







 Name  Lakhwinder Kevin DO FACC (transmitted by agent of provider Patricia Trujillo)

 

 Address  [address]



   Unavailable



   [place] [address]









Care Team Providers







 Name  Role  Phone

 

 Aleksandr Pearl MD - Family Medicine  Care Team Information   [phone]
[phone]









Problems







 Description

 

 No Information Available







Social History







 Type  Date  Description  Comments

 

 Birth Sex    Unknown  

 

 Tobacco Use  Start: Unknown  Never Smoked Cigarettes  

 

 Smoking Status  Reviewed: 19  Never Smoked Cigarettes  

 

 ETOH Use    Rarely consumes alcohol  

 

 Tobacco Use  Start: Unknown  Patient has never smoked  

 

 Recreational Drug Use    Denies Drug Use  

 

 Exercise Type/Frequency    Does not exercise  







Allergies, Adverse Reactions, Alerts







 Active Allergies  Reaction  Severity  Comments  Date

 

 Penicillin      per  patient  2015







Medications







 Active Medications  SIG  Qnty  Indications  Ordering Provider  Date

 

 Multivitamins  1 by mouth every      Unknown  



            Capsules  day        



           

 

 Ibuprofen  3 tabs by mouth      Unknown  



       200mg Tablets  up to four times        



   a day as needed        







Immunizations







 Description

 

 No Information Available







Vital Signs







 Date  Vital  Result  Comment

 

 2019  2:36pm  Height  71 inches  5'11"









 Weight  230.00 lb  with out shoes

 

 BP Systolic  124 mmHg  Rue lg cuff

 

 BP Diastolic  84 mmHg  Rue lg cuff

 

 BP Systolic Sitting  122 mmHg  Lue lg cuff

 

 BP Diastolic Sitting  82 mmHg  Lue lg cuff

 

 BP Systolic Standing  120 mmHg  Lue lg cuff

 

 BP Diastolic Standing  90 mmHg  Lue lg cuff

 

 BMI (Body Mass Index)  32.1 kg/m2  

 

 Ejection Fraction  55-60%  date 4/03/15 ECHO









 2015 12:57pm  Height  71 inches  5'11"









 Weight  244.25 lb  with shoes

 

 Heart Rate  82 /min  

 

 BP Systolic Sitting  124 mmHg  LA reg cuff

 

 BP Diastolic Sitting  88 mmHg  LA reg cuff

 

 Respiratory Rate  17 /min  

 

 BMI (Body Mass Index)  34.1 kg/m2  







Results







 Description

 

 No Information Available







Procedures







 Date  Code  Description  Status

 

 2019  33358  EKG Tracing & Interpretation  Completed







Medical Devices







 Description

 

 No Information Available







Encounters







 Type  Date  Location  Provider  Dx  Diagnosis

 

 Office Visit  2019  [facility]  Lakhwinder Kevin  [id number]  Disease 
of



   3:00p  Of [facility]  DO FACC    pericardium,



           unspecified







Assessments







 Date  Code  Description  Provider

 

 2019  I31.9  Disease of pericardium, unspecified  Lakhwinder Kevin DO FACC







Plan of Treatment

Future Appointment(s):2019  4:00 pm - [facility] ECHO Schedule at [facility]2019 - Lakhwinder Kevin DO FACCI31.9 Disease of 
pericardium, unspecifiedNew Orders:Echocardiogram, Scheduled: 19Follow up:
PRN



Functional Status







 Description

 

 No Information Available







Mental Status







 Description

 

 No Information Available







Referrals







 Description

 

 No Information Available

## 2019-10-21 NOTE — XMS REPORT
Bea Arreaga [phone] for patient to call [facility]. [facility] - Continuity of Care Document

 Created on:2019



Patient:SAM YEAGER

Sex:Male

:1980

External Reference #:[id number]





Demographics







 Address  [address] [place], [place] [address]

 

 Mobile Phone  [phone]

 

 Preferred Language  en

 

 Marital Status  Not  or 

 

 Presybeterian Affiliation  Unknown

 

 Race  White

 

 Ethnic Group  Not  or 









Author







 Organization  [facility]









Support







 Name  Relationship  Address  Phone

 

 DWAINE YEAGER  spouse  [address]  [phone]



     [place] [address]  

 

 DWAINE YEAGER  spouse  [address]  [phone]



     [place] [address]  









Allergies and Intolerances

No Known Allergies



Medications

No Known Medications



Medications At Time Of Discharge

No data in the system



Problems

No Data in the system



Procedures

No data in the system



Results

No data in the system



Social History







 Code  Code System  Social History Observation  Description  Dates Observed

 

 073571543  SNOMED CT  Current Smoking Status  Never smoker  

 

 UNK  AdministrativeGender  Sex Assigned At Birth  Unknown  







Vital Signs







 Code  Code System  Vitals  Value  Date

 

 8310-5  [facility]  Body Temperature  98.1 [degF]  10/02/2019

 

 8865-8  [facility]  Pulse Rate  72 {beats}/min  10/02/2019

 

 9279-1  [facility]  Respiratory Rate  18 /min  10/02/2019

 

 30981-3  [facility]  O2% BldC Oximetry  98 %  10/02/2019

 

 8480-6  [facility]  BP Systolic  120 mm[Hg]  10/02/2019

 

 8462-4  LOINC  BP Diastolic  79 mm[Hg]  10/02/2019

 

 8302-2  INC  Height  71 [in_i]  10/02/2019

 

 81922-3  [facility]  Weight  104 kg  10/02/2019

 

 3140-1  [facility]  Body surface area Derived from formula  2.23 m2  10/02/2019

 

 52654-2  [facility]  BMI (Body Mass Index)  32 kg/m2  10/02/2019







Goals Section

No data in the system



Health Concerns

No data in the systemEncounter Diagnosis









 Date  Code  Code System  Diagnosis  Status

 

   Z20.3  ICD10  CONTACT WITH AND EXPOSURE TO RABIES  Active







Advance Directives

No Advance Directives Reported



Encounters







 Encounter Diagnosis  Location  Date

 

  CONTACT WITH AND EXPOSURE TO RABIES  [facility]  10/02/2019







Family History

Family history not obtained



Functional Status







 Code  Functional Condition  Code System  Date  Status

 

   Independent adls  SNOMED CT  10/02/2019  Active







Immunizations







 Vaccine Code  Code System  Vaccine Name  Date  Status

 

 175  CVX  Human Rabies vaccine from human diploid  10/02/2019  Completed



     cell culture    

 

 175  CVX  Human Rabies vaccine from human diploid  10/05/2019  Completed



     cell culture    

 

 115  CVX  tetanus toxoid, reduced diphtheria  10/05/2019  Completed



     toxoid, and acellular pertussis    



     vaccine, adsorbed    







Medical Equipment

No data in the system



Mental Status







 Code  Cognitive Condition  Code System  Date  Status

 

   Oriented x 3  SNOMED CT  10/02/2019  Active

 

   No acute distress  SNOMED CT  10/02/2019  Active

 

   Affect appropriate  SNOMED CT  10/02/2019  Active

 

   Alert, easy to arouse  SNOMED CT  10/02/2019  Active







Assessment and Plan

Assessments

No data in the systemPlan Of Treatment

No data in the systemPending Tests

No data in the system



Hospital Discharge Instructions

No data in the system



Reason for Visit







 Reason for Visit

 

 Rabies Vaccine

## 2019-10-21 NOTE — XMS REPORT
Continuity of Care Document (CCD)

 Created on:October 10, 2019



Patient:Lasha Quiroz

Sex:Male

:1980

External Reference #:[id number]





Demographics







 Address  [address] [place] [address]

 

 Home Phone  [phone]

 

 Mobile Phone  [phone]

 

 Work Phone  [phone]

 

 Email Address  [email or web address]

 

 Preferred Language  en

 

 Marital Status  Not  or 

 

 Scientologist Affiliation  Unknown

 

 Race  White

 

 Ethnic Group  Not  or 









Author







 Name  Aleksandr Beebe MD

 

 Address  [address] [place] [address]









Care Team Providers







 Name  Role  Phone

 

 Aleksandr Pearl MD - Family  Care Team Information   [phone]
[phone]



 Medicine    

 

 [facility] Utilization   Care Team Information   [phone]
[phone]



 - Health Educator    

 

 Lakhwinder Kevin MD - Cardiovascular  Care Team Information   [phone]
[phone]



 Disease    









Problems







 Active Problems  Provider  Date

 

 Arthralgia of the lower leg  Clayton Alcantar M.D.  Onset: 2013

 

 Enthesopathy  Clayton Alcantar M.D.  Onset: 2012

 

 Contact dermatitis due to plants, except  Aleksandr Pearl M.D.  Onset: 



 food    







Social History







 Type  Date  Description  Comments

 

 Birth Sex    Unknown  

 

 Tobacco Use  Start: Unknown  Nonsmoker  

 

 Tobacco Use  Start: Unknown  Patient has never smoked  

 

 Smoking Status  Reviewed: 18  Patient has never smoked  







Allergies, Adverse Reactions, Alerts







 Active Allergies  Reaction  Severity  Comments  Date

 

 Penicillins        2014









 Inactive Allergies









 NKDA        2012







Medications







 Active Medications  SIG  Qnty  Indications  Ordering Provider  Date

 

 Clobetasol Propionate  use twice a day  60gm    Aleksandr MARSH  2019



                   0.05%  on arms and      MD Abiel  



 Cream  trunk        

 

 Multivitamins  1 po qd      Unknown  



            Tablets          



           

 

 Ibuprofen  3 tabs by mouth      Unknown  



       200mg Tablets  up to 4 times a        



   day as needed        









 History Medications









 Prednisone  3 tabs daily  18tabs  R21  Aleksandr MARSH  2019 -



           10mg  for 3 days,      MD Abiel  2019



 Tablets  then 2 tabs        



   daily for 3        



   days, then 1        



   tab daily for 3        



   days.        

 

 Clobetasol  use on arms and  30gm    Aleksandr T.  2019 -



 Propionate  trunk as needed      MD Abiel  2019



           0.05%  for up to 14        



 Cream  days        



           







Immunizations







 CPT Code  Status  Date  Vaccine  Lot #

 

 [id number]  Given  2018  VFC Influenza vac quadrivalent preservative  



       free 6 mths and up  

 

 [id number]  Given  2016  Influenza vac quadrivalent preservative free 6  



       months and up  

 

 71900  Given  2010  DO Not Use Split Influenza Virus Vaccine  [id number]

 

 00267  Given  2004  Td Immunization, For Use In Individuals 7 Years  



       Or Older  







Vital Signs







 Date  Vital  Result  Comment

 

 10/09/2019  1:22pm  BP Systolic  126 mmHg  









 BP Diastolic  68 mmHg  

 

 Heart Rate  60 /min  

 

 Body Temperature  98.1 F  

 

 Respiratory Rate  16 /min  

 

 Height  71 inches  5'11"

 

 Weight  239.00 lb  

 

 BMI (Body Mass Index)  33.3 kg/m2  









 2019  4:42pm  BP Systolic  118 mmHg  









 BP Diastolic  72 mmHg  

 

 Heart Rate  82 /min  

 

 Body Temperature  97.9 F  

 

 Respiratory Rate  16 /min  

 

 Weight  228.00 lb  







Results







 Test  Date  Facility  Test  Result  H/L  Range  Note

 

 Laboratory test  10/09/2019  [facility]  Sedimentation Rate  5mm      



 finding    (607)-   -          

 

 CBC Electronic  10/09/2019  [facility]  WBC  6.45    4.0-10.0  



 (a New)    (607)-   -          









 RBC  5.01    3.93-6.0  

 

 Hemoglobin ([facility])  15.1 g/dL    12.0-17.0  

 

 Hematocrit ([facility])  44.3 %    35.0-50.0  

 

 Mean Corpuscular Vol  88.4 fL    80-95  

 

 Mean Corpuscular Hemoglobin  30.1 pg    25.6-32.2  

 

 Mean Corpuscular Hemo Concen  34.1 g/dL    32.2-36.0  

 

 Platelets  243 10^3/ul    163-400  

 

 RDW-CV  12.2    11.6-14.4  

 

 Mean Platelet Volume  10.3 fL    8.0-12.4  

 

 Absolute Neutrophils BLD  2.85    1.56-6.13  

 

 Absolute Lymphocytes  2.89    1.18-3.74  

 

 Absolute Monocytes BLD Auto  0.53    0.24-0.82  

 

 Absolute Eos Blood  0.15    0.04-0.54  

 

 Absolute Basophils  0.02    0.01-0.08  

 

 Neutrophil %  44.2 %    34.0-70.0  

 

 Lymph%  44.8 %    20.0-52.0  

 

 Monocytes %  8.2 %    5.0-12.0  

 

 Eos %  2.3 %    0.7-7.0  

 

 Basophil%  0.3 %    0-1.2  









 Laboratory test  10/09/2019  [facility]  C-Reactive Protein,  2 mg/L    0-10  1



 finding    [address] [place] [address]  Quant        



     [place] [address]          



     (607)-   -          









 Antinuclear Antibodies (Michelle), By Ifa  <pending>      









 Rheumatoid  10/09/2019  [facility]  Ra Latex  <10.0 IU/mL    0.0-13.9  



 Arthritis Factor    [address] [place] [address]  Turbid.        



 ([facility] [address]          



     (609)-   -          

 

 Laboratory test  10/09/2019  [facility]  [id number]  <pending>      



 finding    [address] [place] [id number]          



     (600)-   -          









 Antistreptolysin O Ab  136.3 IU/mL    0.0-200.0  









 Laboratory test finding  2019  [facility]  Troponin I  0.00 ng/mL    <0.04  2

 

 Laboratory test finding  2019  [facility]  B-Type Natriuretic  25 pg/mL    <=
100  



       Peptide BNP        









 Lactic Acid  1.4 mmol/L  Normal  0.5-2.0  3









 Inr/Protime  2019  [facility]  Inr  0.93  Normal  0.82-1.09  4

 

 CBC Auto Diff  2019  [facility]  White Blood Count  13.5 10^3/uL  High  3.5-
10.8  









 Red Blood Count  4.97 10^6/uL  Normal  4.18-5.48  

 

 Hemoglobin  15.6 g/dL  Normal  14.0-18.0  

 

 Hematocrit  45 %  Normal  42-52  

 

 Mean Corpuscular Volume  90 fL  Normal  80-94  

 

 Mean Corpuscular Hemoglobin  31 pg  Normal  27-31  

 

 Mean Corpuscular HGB Conc  35 g/dL  Normal  31-36  

 

 Red Cell Distribution Width  14 %  Normal  10-15  

 

 Platelet Count  257 10^3/uL  Normal  150-450  

 

 Mean Platelet Volume  8.4 fL  Normal  7.4-10.4  

 

 Abs Neutrophils  6.5 10^3/uL  Normal  1.5-7.7  

 

 Abs Lymphocytes  5.4 10^3/uL  High  1.0-4.8  

 

 Abs Monocytes  0.8 10^3/uL  Normal  0-0.8  

 

 Abs Eosinophils  0.7 10^3/uL  High  0-0.6  

 

 Abs Basophils  0.2 10^3/uL  Normal  0-0.2  

 

 Abs Nucleated RBC  0.0 10^3/uL      

 

 Granulocyte %  48.5 %      

 

 Lymphocyte %  39.7 %      

 

 Monocyte %  5.6 %      

 

 Eosinophil %  4.9 %      

 

 Basophil %  1.3 %      

 

 Nucleated Red Blood Cells %  0.1      









 Comp Metabolic Panel  2019  [facility]  Sodium  138 mmol/L  Normal  135-145  









 Chloride  104 mmol/L  Normal  101-111  

 

 Co2 Carbon Dioxide  27 mmol/L  Normal  22-32  

 

 Glucose  107 mg/dL  High    

 

 Blood Urea Nitrogen  21 mg/dL  Normal  6-24  

 

 Creatinine  0.82 mg/dL  Normal  0.67-1.17  

 

 BUN/Creatinine Ratio  25.6  High  8-20  

 

 Calcium  9.3 mg/dL  Normal  8.6-10.3  

 

 Total Protein  6.4 g/dL  Normal  6.4-8.9  

 

 Albumin  4.2 g/dL  Normal  3.2-5.2  

 

 Globulin  2.2 g/dL  Normal  2-4  

 

 Albumin/Globulin Ratio  1.9  Normal  1-3  

 

 Total Bilirubin  0.40 mg/dL  Normal  0.2-1.0  

 

 Alkaline Phosphatase  50 U/L  Normal    

 

 Alt  25 U/L  Normal  7-52  

 

 Egfr Non-  105.1    >60  

 

 Egfr   127.2    >60  5

 

 Potassium  3.7 mmol/L  Normal  3.5-5.0  

 

 Anion Gap  7 mmol/L  Normal  2-11  

 

 Ast  16 U/L  Normal  13-39  









 Laboratory test finding  2019  [facility]  Troponin I  0.00 ng/mL    <0.04  6









 Pathologist Review  (SEE NOTE)      7









 1  3 SSTs 1 Lav Top Whole Blo od

 

 2  Troponin-I testing on Plasma Separator Tubes (PST) has a



   known false positive rate of 0.20-0.40%.  All positive



   troponins reflex immediately to secondary confirmatory



   testing.



   



   Using the i[employer] DxI 800 Access Immunoassay systems, the



   99th percentile upper reference limit was demonstrated to be



   < 0.03 ng/mL.

 

 3  [facility] Severe Sepsis and Septic Shock Management Bundle Measure



   requires all lactic acids initially measuring >2.0 mmol/L be



   repeated.

 

 4  Standard intensity warfarin therapeutic range: 2.0-3.0



   High intensity warfarin therapeutic range: 2.5-3.5

 

 5  *******Because ethnic data is not always readily available,



   this report includes an eGFR for both -Americans and



   non- Americans.****



   The National Kidney Disease Education Program (NKDEP) does



   not endorse the use of the MDRD equation for patients that



   are not between the ages of 18 and 70, are pregnant, have



   extremes of body size, muscle mass, or nutritional status,



   or are non- or non-.



   According to the National Kidney Foundation, irrespective of



   diagnosis, the stage of the disease is based on the level of



   kidney function:



   Stage Description                      GFR(mL/min/1.73 m(2))



   1     Kidney damage with normal or decreased GFR       90



   2     Kidney damage with mild decrease in GFR          60-89



   3     Moderate decrease in GFR                         30-59



   4     Severe decrease in GFR                           15-29



   5     Kidney failure                       <15 (or dialysis)

 

 6  Troponin-I testing on Plasma Separator Tubes (PST) has a



   known false positive rate of 0.20-0.40%.  All positive



   troponins reflex immediately to secondary confirmatory



   testing.



   



   Using the ivers[employer] DxI 800 Access Immunoassay systems, the



   99th percentile upper reference limit was demonstrated to be



   < 0.03 ng/mL.

 

 7  Reactive lymphocytosis and monocytosis.



   



   Reviewed by Christen Scott MD







Procedures







 Description

 

 No Information Available







[employer] Devices







 Description

 

 No Information Available







Encounters







 Type  Date  Location  Provider  Dx  Diagnosis

 

 Office Visit  2019  Main Office  Aleksandr MARSH  R2Gaby  Rash and other



   4:30p    MD Abiel    nonspecific skin



           eruption

 

 Office Visit  2019  Main Office  Aleksandr MARSH  R07.89  Other chest pain



   4:30p    MD Abiel    









 L24.7  Irritant contact dermatitis due to plants, except food







Assessments







 Date  Code  Description  Provider

 

 10/09/2019  M25.50  Pain in unspecified joint  Aleksandr Beebe MD

 

 2019  R21  Rash and other nonspecific skin eruption  Aleksandr Beebe MD

 

 2019  R07.89  Other chest pain  Aleksandr Beebe MD

 

 2019  L24.7  Irritant contact dermatitis due to plants,  Aleksandr Beebe MD



     except food  







Plan of Treatment

10/09/2019 - Aleksandr Beebe, MDM25.50 Pain in unspecified jointAllComments
:Medication Management Patient Understands medications he's taking?     Yes    
No  Are there Barriersto Adherence?    Yes    No  Has the patient been asked 
about herbal supplements and therapies, and OTC  meds?      Yes    No



Functional Status







 Description

 

 No Information Available







Mental Status







 Description

 

 No Information Available







Referrals







 Refer to   Reason for Referral  Status  Appt Date

 

 Lakhwinder Kevin MD   Chest pain with a history of myopericarditis 5  Scheduled  
2019



   yrs ago    









 [address]

 

 [place] [address]

 

 [phone]